# Patient Record
Sex: MALE | Race: OTHER | Employment: FULL TIME | URBAN - METROPOLITAN AREA
[De-identification: names, ages, dates, MRNs, and addresses within clinical notes are randomized per-mention and may not be internally consistent; named-entity substitution may affect disease eponyms.]

---

## 2024-02-14 ENCOUNTER — OFFICE VISIT (OUTPATIENT)
Dept: ENT CLINIC | Age: 39
End: 2024-02-14
Payer: COMMERCIAL

## 2024-02-14 VITALS — HEIGHT: 68 IN | BODY MASS INDEX: 32.28 KG/M2 | WEIGHT: 213 LBS | RESPIRATION RATE: 18 BRPM

## 2024-02-14 DIAGNOSIS — H60.312 ACUTE DIFFUSE OTITIS EXTERNA OF LEFT EAR: Primary | ICD-10-CM

## 2024-02-14 PROCEDURE — 99203 OFFICE O/P NEW LOW 30 MIN: CPT | Performed by: OTOLARYNGOLOGY

## 2024-02-14 RX ORDER — VALSARTAN 320 MG/1
320 TABLET ORAL DAILY
COMMUNITY

## 2024-02-14 NOTE — PROGRESS NOTES
Chief Complaint   Patient presents with    Ear Problem     Patient states that for about three days now he has been having right ear pain and hearing lost.        HPI:  Johnathon Conn is a 38 y.o. male seen today in initial consultation for his ears.  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  He presented about 3 days ago with left-sided otalgia.  He then developed more muffled hearing in that left ear.  He had been [place] in [place] and swimming at the beach at the end of last week.  He has been on Ciprodex eardrops for the past couple of days, and there has been less pain overall, although his hearing remains muffled on the left side.  He denies any symptoms in the right ear.  He has no previous otologic history.    Past Medical History, Past Surgical History, Family history, Social History, and Medications were all reviewed with the patient today and updated as necessary.     No Known Allergies  There is no problem list on file for this patient.    Current Outpatient Medications   Medication Sig    valsartan (DIOVAN) 320 MG tablet Take 1 tablet by mouth daily     No current facility-administered medications for this visit.     History reviewed. No pertinent past medical history.  Social History     Tobacco Use    Smoking status: Never    Smokeless tobacco: Never   Substance Use Topics    Alcohol use: Never     No past surgical history on file.  No family history on file.     ROS:    Review of Systems   Constitutional:  Negative for chills.   Eyes:  Negative for pain.   Respiratory:  Negative for cough and wheezing.    Cardiovascular:  Negative for chest pain and palpitations.   Gastrointestinal:  Negative for diarrhea and vomiting.   Skin:  Negative for color change and wound.   Allergic/Immunologic: Negative for environmental allergies.   Neurological:  Negative for dizziness and headaches.        PHYSICAL EXAM:    Resp 18   Ht 1.727 m (5' 8\")   Wt 96.6 kg (213 lb)   BMI 32.39 kg/m²